# Patient Record
(demographics unavailable — no encounter records)

---

## 2024-10-15 NOTE — REASON FOR VISIT
[FreeTextEntry1] :  Mr. Marinelli presents complaining of fatigue and an episode where he thought that he was going to faint while walking = this happened about one month ago.  He also had BP that was elevated and Metoprolol was resumed.  He is currently taking AMIO 6 days per week.   .   - Physical Examination (PE) : Physical examination of lungs was normal with clear breathing sounds  ECG shows sinus arrhythmia/sinus bradycardia at 44 bpm  Impression: maintaining SR/SB, chronotropic incompetence aggravated by meds. Plan: - Summary : We discussed that the best treatment for his fatigue and ? near syncope ? at this time is to consider a permanent dual chamber pacemaker for his chronotropic incompetence / aggravated by necessary meds. The procedure was discussed with him in great detail and he will consider. - RECOMMEND: Dual chamber pacemaker.  - He will call in next few days after he considers our discussion.

## 2024-12-23 NOTE — REASON FOR VISIT
[FreeTextEntry1] : Mr. Marinelli is s/p Aveir DR device insertion.  He has no groin complaints.  The patient denies chest pain, SOB, MCKEON, palpitation, light headedness, syncope or change in exertional capacity.  he does not notice a significant difference in his symptoms since the device insertion (Dec 11, 2024).  The device was interrogated and the atrial threshold is elevated above the discharge value of 1.5V@0.4msec.  Today, atrial numbers are: p wave: none Za: 370 Ohms Thresh: 5.5 @ 0.7 msec, 5.0 @ 0.8 msec, 4.25 @ 1.0 msec, 3.75 @ 1.5 msec Reprogrammed to AAIR 65 - 120, output 4V @ 1.5 msec\\  Ventricular device R waves: 8.8 mv Zv: 910 ohms Thresh: 0.5V @ 0.4 msec

## 2025-01-29 NOTE — REASON FOR VISIT
[FreeTextEntry1] : Mr. Marinelli is s/p Aveir DR device insertion.   The patient denies chest pain, SOB, MCKEON, palpitation, light headedness, syncope or change in exertional capacity.  Since our last visit, he has noticed a significant difference in his symptoms - i.e. AAI pacing seems to be working for him.  The device was inserted on Dec 11, 2024.  The device was interrogated again today and the full interrogation is in PACEART.  Atrial pacing thresholds remain elevated and as follows: 3.25 @ 1.5 ms, 3.5 V@ 1.0 ms 4.0 V @ 0.7 ms.  Ventricular parameters remain excellent.

## 2025-03-18 NOTE — REASON FOR VISIT
[FreeTextEntry1] : Mr. Marinelli is s/p Aveir DR device insertion.   He is feeling very well.  He is approaching 12M post Reid guided ablation.  The patient denies chest pain, SOB, MCKEON, palpitation, light headedness, syncope or change in exertional capacity.  Since our last visit, he has noticed a continued, significant difference in his symptoms - i.e. AAI pacing seems to be working for him.  The device was inserted on Dec 11, 2024.  Last visit we reduced AMIO to 3d. per week.  The device was interrogated again today and the full interrogation is scanned into his medical record.

## 2025-06-24 NOTE — PROCEDURE
[Pacemaker] : pacemaker [de-identified] : See scanned report [de-identified] : [employer]

## 2025-06-24 NOTE — REASON FOR VISIT
[FreeTextEntry1] : Mr. Marinelli is s/p Aveir DR device insertion.   He is feeling very well.  He notes that his exertional capacity and fatigue have improved.  The patient denies chest pain, SOB, MCKEON, palpitation, light headedness, syncope or change in exertional capacity.   The device was inserted on Dec 11, 2024.  Last visit we reduced AMIO to 3d. per week.  he would like to consider stopping this medication altogether.  The device was interrogated again today and the results of my interrogation are scanned into his medical record.

## 2025-07-01 NOTE — REASON FOR VISIT
[FreeTextEntry1] : Mr. Marinelli is s/p Aveir DR device insertion for SSS and more recently poor AV conduction.  We reprogrammed his device last visit with increased AAI and VVI rates (not DDD due to poor i2i communication).  He returns today feeling a little weaker than he did before that programming and feels that his exertional capacity worsened.  The patient denies chest pain, SOB, palpitation, light headedness, syncope.   The device was inserted on Dec 11, 2024. We stopped amiodarone last visit (Junme 24).  The device was interrogated again today and the results of my interrogation are scanned into his medical record.